# Patient Record
Sex: MALE | Race: WHITE | Employment: OTHER
[De-identification: names, ages, dates, MRNs, and addresses within clinical notes are randomized per-mention and may not be internally consistent; named-entity substitution may affect disease eponyms.]

---

## 2017-01-06 NOTE — PATIENT DISCUSSION
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 Advised patient to call our office with decreased vision or increased symptoms.

## 2017-01-06 NOTE — PATIENT DISCUSSION
"(K16.034) Keratoconjunct sicca, not specified as Sjogren's, bilateral - Assesment : Examination revealed Dry Eye Syndrome ADVISED PATIENT XIIDRA MAY BURN INITIALLY, COULD HAVE TEMPORARY BLURRED VISION AFTER DROP. RECOMMEND USING AT NIGHT.  - Plan : D/C RESTASIS  START XIIDRA-SAMPLE TO PATIENT WITH COUPON FOR 1 MONTH SAVINGS CARD  START OU QD X 3 DAYS THEN INCREASE TO BID  START ""BLINK PRESERVATIVE FREE ATS OU TID"""

## 2017-01-06 NOTE — PATIENT DISCUSSION
(J05.607) Other secondary cataract, bilateral - Assesment : Significant posterior capsule opacification present, 4+ OD, 2+ OS - Plan : Recommend YAG OU, RBA's patient wishes to proceed.  Handout given to patient

## 2017-03-09 NOTE — PATIENT DISCUSSION
(P47.830) Other secondary cataract, bilateral - Assesment : S/P YAG--open - Plan : Monitor for changes.   RTC January 2018 for exam

## 2018-01-15 NOTE — PATIENT DISCUSSION
(H26.491) Other secondary cataract, right eye - Assesment : Significant posterior capsule opacification present. - Plan : YAG REVISION OD

## 2018-01-15 NOTE — PATIENT DISCUSSION
(K45.942) Keratoconjunct sicca, not specified as Sjogren's, bilateral - Assesment : Examination revealed Dry Eye Syndrome. RECOMMEND OTONIEL GONSALEZ SUNGLASSES TO HELP DECREASE GLARE WHILE GOLFING. - Plan : Monitor for changes. Advised patient to call our office with decreased vision or increased symptoms.  START EZ TEAR VITS  SYSTANE BALANCE OU TID  SYSTANE GEL OU QHS  1 YEAR EXAM

## 2018-02-20 NOTE — PATIENT DISCUSSION
(H26.538) Other secondary cataract, right eye - Assesment : s/p Yag Cap OD. Doing well. - Plan : Monitor for changes. Call with any vision changes, problems, or concerns. RTC in January for Exam, sooner if problems or changes.

## 2019-04-12 NOTE — PATIENT DISCUSSION
(R85.2311) Nonexudative age-related macular degeneration bilateral alvarez - Assesment : Examination revealed AMD Dry. - Plan : Monitor for changes. Advised patient to call our office with decreased vision or increased distortion.

## 2019-04-12 NOTE — PATIENT DISCUSSION
(X29.306) Keratoconjunct sicca, not specified as Sjogren's, bilateral - Assesment : Examination revealed Dry Eye Syndrome. - Plan : Monitor for changes. Advised patient to call our office with decreased vision or increased symptoms.  START EZ TEAR VITS  SYSTANE BALANCE OU TID  SYSTANE GEL OU QHS  1 YEAR EXAM

## 2021-01-26 ENCOUNTER — NEW PATIENT COMPREHENSIVE (OUTPATIENT)
Age: 36
End: 2021-01-26

## 2021-01-26 DIAGNOSIS — H52.13: ICD-10-CM

## 2021-01-26 PROCEDURE — 92004 COMPRE OPH EXAM NEW PT 1/>: CPT

## 2021-01-26 PROCEDURE — 92015VEC REFRACTION-VEC

## 2021-01-26 PROCEDURE — 92310-2 LEVEL 2 CONTACT LENS MANAGEMENT

## 2021-01-26 ASSESSMENT — KERATOMETRY
OD_K2POWER_DIOPTERS: 43.25
OD_K1POWER_DIOPTERS: 43
OS_K2POWER_DIOPTERS: 43.25
OS_K1POWER_DIOPTERS: 43

## 2021-01-26 ASSESSMENT — VISUAL ACUITY
OD_SC: 20/400
OS_SC: 20/200
OD_CC: 20/20
OS_CC: 20/20

## 2021-06-28 NOTE — PATIENT DISCUSSION
Continue Artificial Tears: One drop to both eyes 3-4 times daily. We recommend Systane or Refresh lubricating eye drops which can be found at any pharmacy.

## 2021-09-19 NOTE — PATIENT DISCUSSION
Artificial Tears: One drop to both eyes 3-4 times daily. We recommend Systane or Refresh lubricating eye drops which can be found at any pharmacy. No

## 2022-07-21 NOTE — PATIENT DISCUSSION
7 20 22 INTERMITTENT FOR A FEW MINUTES - FOR COUPLE MONTHS - DENIES HA WEAKNESS PARASTHESIA - H/O PARKINSON'S DS -  CTM DR KEITH.

## 2023-01-10 ENCOUNTER — COMPREHENSIVE EXAM (OUTPATIENT)
Dept: URBAN - METROPOLITAN AREA CLINIC 27 | Facility: CLINIC | Age: 38
End: 2023-01-10

## 2023-01-10 DIAGNOSIS — H52.13: ICD-10-CM

## 2023-01-10 PROCEDURE — 92310-1 LEVEL 1 CONTACT LENS MANAGEMENT

## 2023-01-10 PROCEDURE — 92014 COMPRE OPH EXAM EST PT 1/>: CPT

## 2023-01-10 PROCEDURE — 92015 DETERMINE REFRACTIVE STATE: CPT

## 2023-01-10 ASSESSMENT — KERATOMETRY
OD_K1POWER_DIOPTERS: 43
OS_K2POWER_DIOPTERS: 43.50
OS_K1POWER_DIOPTERS: 43
OD_AXISANGLE2_DEGREES: 66
OS_K1POWER_DIOPTERS: 43.00
OS_K2POWER_DIOPTERS: 43.25
OD_K2POWER_DIOPTERS: 43.25
OS_AXISANGLE_DEGREES: 178
OS_AXISANGLE2_DEGREES: 88
OD_AXISANGLE_DEGREES: 156
OD_K1POWER_DIOPTERS: 43.00

## 2023-01-10 ASSESSMENT — VISUAL ACUITY
OS_CC: 20/20
OD_CC: 20/20

## 2023-01-10 ASSESSMENT — TONOMETRY
OS_IOP_MMHG: 16
OD_IOP_MMHG: 15

## 2024-04-19 ENCOUNTER — LAB (OUTPATIENT)
Dept: URBAN - METROPOLITAN AREA CLINIC 63 | Facility: CLINIC | Age: 39
End: 2024-04-19

## 2024-04-19 ENCOUNTER — OV NP (OUTPATIENT)
Dept: URBAN - METROPOLITAN AREA CLINIC 63 | Facility: CLINIC | Age: 39
End: 2024-04-19
Payer: COMMERCIAL

## 2024-04-19 VITALS
OXYGEN SATURATION: 96 % | BODY MASS INDEX: 43.12 KG/M2 | HEART RATE: 90 BPM | HEIGHT: 70 IN | DIASTOLIC BLOOD PRESSURE: 70 MMHG | SYSTOLIC BLOOD PRESSURE: 120 MMHG | TEMPERATURE: 97.2 F | WEIGHT: 301.2 LBS

## 2024-04-19 DIAGNOSIS — R10.10 UPPER ABDOMINAL PAIN: ICD-10-CM

## 2024-04-19 DIAGNOSIS — Z12.11 COLON CANCER SCREENING: ICD-10-CM

## 2024-04-19 DIAGNOSIS — K58.0 IRRITABLE BOWEL SYNDROME WITH DIARRHEA: ICD-10-CM

## 2024-04-19 PROBLEM — 197125005: Status: ACTIVE | Noted: 2024-04-19

## 2024-04-19 PROCEDURE — 99203 OFFICE O/P NEW LOW 30 MIN: CPT

## 2024-04-19 RX ORDER — OMEPRAZOLE 40 MG/1
1 CAPSULE 30 MINUTES BEFORE MORNING MEAL CAPSULE, DELAYED RELEASE ORAL ONCE A DAY
Qty: 90 | Refills: 3 | OUTPATIENT
Start: 2024-04-19

## 2024-04-19 RX ORDER — TIRZEPATIDE 7.5 MG/.5ML
INJECT THE CONTENTS OF ONE SYRINGE UNDER THE SKIN ONCE WEEKLY INJECTION, SOLUTION SUBCUTANEOUS
Qty: 2 UNSPECIFIED | Refills: 0 | Status: ACTIVE | COMMUNITY

## 2024-04-19 NOTE — HPI-ZZZTODAY'S VISIT
Carlo is a very pleasant 38-year-old male who presents for abdominal pain.  He is new patient to our practice and will be establishing with Dr. Cano today.  Past medical history significant for morbid obesity, back pain, ED, eosinophil count raised, IV, vasovagal syncope, anxiety, insomnia.  Past surgical history reviewed.  Denies a family history of colon polyps or GI malignancy.  Carlo presents for chief complaint of abdominal pain.  He relates a right upper quadrant dull nonradiating constant discomfort and pain for one year.  It is grossly unchanged by prandial events or bowel movements.  It is unrelated to gas pains as well.  He also relates a history of IBS.  He has had diarrhea since high school 2-3 times a day until recently when he started Zepbound.  This has helped him lose about 30 pounds.  This has alleviated his IBS significantly.  He does notice if he overeats while taking this medication it will make him sick.  When questioned about acid reflux he states fluctuating heartburn where he seems to go through phases with flares for which she will use Prilosec with efficacy.  He denies dysphagia, dyspepsia, change in bowel habits, unintentional weight loss, melena, hematochezia.

## 2024-07-03 ENCOUNTER — OFFICE VISIT (OUTPATIENT)
Dept: URBAN - METROPOLITAN AREA SURGERY CENTER 4 | Facility: SURGERY CENTER | Age: 39
End: 2024-07-03

## 2024-07-18 ENCOUNTER — OFFICE VISIT (OUTPATIENT)
Dept: URBAN - METROPOLITAN AREA CLINIC 63 | Facility: CLINIC | Age: 39
End: 2024-07-18

## 2024-07-29 ENCOUNTER — TELEPHONE ENCOUNTER (OUTPATIENT)
Dept: URBAN - METROPOLITAN AREA CLINIC 63 | Facility: CLINIC | Age: 39
End: 2024-07-29

## 2024-07-29 RX ORDER — OMEPRAZOLE 40 MG/1
1 CAPSULE 30 MINUTES BEFORE MORNING MEAL CAPSULE, DELAYED RELEASE ORAL ONCE A DAY
Qty: 90 | Refills: 3
Start: 2024-04-19

## 2024-08-15 ENCOUNTER — TELEPHONE ENCOUNTER (OUTPATIENT)
Dept: URBAN - METROPOLITAN AREA CLINIC 63 | Facility: CLINIC | Age: 39
End: 2024-08-15

## 2024-08-15 RX ORDER — OMEPRAZOLE 40 MG/1
1 CAPSULE 30 MINUTES BEFORE MORNING MEAL CAPSULE, DELAYED RELEASE ORAL ONCE A DAY
Qty: 90 | Refills: 3
Start: 2024-04-19

## 2025-02-19 ENCOUNTER — COMPREHENSIVE EXAM (OUTPATIENT)
Age: 40
End: 2025-02-19

## 2025-02-19 DIAGNOSIS — H52.13: ICD-10-CM

## 2025-02-19 PROCEDURE — 92014 COMPRE OPH EXAM EST PT 1/>: CPT

## 2025-02-19 PROCEDURE — 92499RS OCT RETINAL SCREENING, ELECTIVE

## 2025-02-19 PROCEDURE — 92015 DETERMINE REFRACTIVE STATE: CPT

## 2025-02-19 PROCEDURE — 92310-3 LEVEL 3 SOFT LENS UPDATE: Mod: 21
